# Patient Record
Sex: FEMALE | Race: WHITE | Employment: STUDENT | URBAN - METROPOLITAN AREA
[De-identification: names, ages, dates, MRNs, and addresses within clinical notes are randomized per-mention and may not be internally consistent; named-entity substitution may affect disease eponyms.]

---

## 2023-02-01 ENCOUNTER — HOSPITAL ENCOUNTER (OUTPATIENT)
Dept: MAMMOGRAPHY | Age: 23
Discharge: HOME | End: 2023-02-04
Payer: COMMERCIAL

## 2023-02-01 DIAGNOSIS — N64.4 MASTODYNIA: ICD-10-CM

## 2023-02-01 PROCEDURE — 76642 ULTRASOUND BREAST LIMITED: CPT

## 2023-04-18 ENCOUNTER — OFFICE VISIT (OUTPATIENT)
Dept: ORTHOPEDIC SURGERY | Age: 23
End: 2023-04-18

## 2023-04-18 DIAGNOSIS — M25.511 RIGHT SHOULDER PAIN, UNSPECIFIED CHRONICITY: Primary | ICD-10-CM

## 2023-04-18 RX ORDER — TRIAMCINOLONE ACETONIDE 40 MG/ML
40 INJECTION, SUSPENSION INTRA-ARTICULAR; INTRAMUSCULAR ONCE
Status: COMPLETED | OUTPATIENT
Start: 2023-04-18 | End: 2023-04-19

## 2023-04-18 NOTE — PROGRESS NOTES
Name: Adrianne Benavidez  YOB: 2000  Gender: female  MRN: [id number]      CC: Shoulder Pain (R)       HPI: Adrianne Benavidez is a 26 y.o. female who presents with Shoulder Pain (R)  Sally Still is a new patient who is presenting today with pain in her R shoulder. She reports she has played tennis for the past 10 years, and she recently has begun having pain in her shoulder. She reports the pain both anteriorly and posteriorly, as well as up into her trapezius. She has noticeable popping and clicking, and she reports her shoulder will occasionally freeze. She has pain with flexion past 90, extension in any range, abduction/adduction, and external rotation. She has no radicular symptoms. She has no history of injury. She has not sought formal treatment before today. ROS/Meds/PSH/PMH/FH/SH: I personally reviewed the patients standard intake form. Below are the pertinents    Tobacco:  reports that she has never smoked. She has never used smokeless tobacco.  Diabetes: none  Other: None    Physical Examination:  General: no acute distress  Lungs: breathing easily  CV: regular rhythm by pulse  Right Shoulder: Obvious deformity of the biceps. Tenderness to palpation of posterior lateral shoulder. Active forward flexion to 170 degrees with pain in the extreme. External rotation with elbows at side equal bilaterally. External rotation with elbows at side resisted range of motion 5/5 strength. Pain with empty can testing but 5/5 strength empty can position. Louie Pleas, Neer's. Pain with Barrow's, negative speeds. Positive O'Lexus's. Negative apprehension test.      Imaging:   Shoulder XR: Grashey, Axillary and Scapula Yviews     Clinical Indication:  1.  Right shoulder pain, unspecified chronicity           Report: Grashey, Axillary and Scapula Y XRs of the Right shoulder demonstrates no acute fracture dislocation or advanced degenerative change    Impression: No acute findings as above        All imaging

## 2023-04-19 RX ADMIN — TRIAMCINOLONE ACETONIDE 40 MG: 40 INJECTION, SUSPENSION INTRA-ARTICULAR; INTRAMUSCULAR at 10:41

## 2023-04-21 ENCOUNTER — TELEPHONE (OUTPATIENT)
Dept: ORTHOPEDIC SURGERY | Age: 23
End: 2023-04-21